# Patient Record
Sex: MALE | Race: BLACK OR AFRICAN AMERICAN
[De-identification: names, ages, dates, MRNs, and addresses within clinical notes are randomized per-mention and may not be internally consistent; named-entity substitution may affect disease eponyms.]

---

## 2017-07-25 ENCOUNTER — HOSPITAL ENCOUNTER (EMERGENCY)
Dept: HOSPITAL 61 - ER | Age: 22
Discharge: HOME | End: 2017-07-25
Payer: SELF-PAY

## 2017-07-25 VITALS — DIASTOLIC BLOOD PRESSURE: 66 MMHG | SYSTOLIC BLOOD PRESSURE: 122 MMHG

## 2017-07-25 VITALS — WEIGHT: 130 LBS | BODY MASS INDEX: 19.7 KG/M2 | HEIGHT: 68 IN

## 2017-07-25 DIAGNOSIS — R20.0: ICD-10-CM

## 2017-07-25 DIAGNOSIS — R07.89: Primary | ICD-10-CM

## 2017-07-25 LAB
CO2 BLD-SCNC: 26 MMOL/L (ref 23–32)
GLUCOSE BLD-MCNC: 77 MG/DL (ref 70–99)
HCT VFR BLD AUTO: 48 % (ref 37–52)
POTASSIUM BLD-SCNC: 3.6 MMOL/L (ref 3.5–5)
SODIUM SERPL-SCNC: 141 MMOL/L (ref 135–145)

## 2017-07-25 PROCEDURE — 93005 ELECTROCARDIOGRAM TRACING: CPT

## 2017-07-25 PROCEDURE — 80047 BASIC METABLC PNL IONIZED CA: CPT

## 2017-07-25 NOTE — PHYS DOC
Past Medical History


Past Medical History:  No Pertinent History


Past Surgical History:  No Surgical History


Alcohol Use:  Heavy


Additional Information:  


PT REPORTS HE IS A DAILY DRINKER, APPROXIMATELY "50 OZ." OF BEER PER DAY. PT 


DENIES HAVING ANY ALCOHOL TODAY.


Drug Use:  None





Adult General


Chief Complaint


Chief Complaint:  CHEST WALL PAIN





HPI


HPI





Patient is a 21  year old male presents with complaints of chest heaviness, 

denies pain. However, the main complaint to the doctor was that he felt 

tingling in his fingers and his face. Patient doesn't describe any weakness or 

pain anywhere, no sick contacts, no exposures.





Review of Systems


Review of Systems





Constitutional: Denies fever or chills 


Eyes: Denies change in visual acuity, redness, or eye pain 


HENT: no headache or pain


Respiratory: Denies cough or shortness of breath 


Cardiovascular: No additional information not addressed in HPI 


GI: Denies abdominal pain





Musculoskeletal: Denies back pain or joint pain or  cramps


Integument: Denies rash or skin lesions []


Neurologic: Denies headache, focal weakness or sensory changes []


Endocrine: Denies polyuria or polydipsia []





Allergies


Allergies





Allergies








Coded Allergies Type Severity Reaction Last Updated Verified


 


  ibuprofen Allergy Severe "THROAT SWELLING" 7/25/17 No











Physical Exam


Physical Exam





Constitutional: Well developed, well nourished, no acute distress, non-toxic 

appearance. []


HENT: Normocephalic, atraumatic, oropharynx moist, no oral exudates, nose 

normal. []


Eyes: PERRLA, EOMI, conjunctiva normal, no discharge. [] 


Neck: Normal range of motion, no tenderness, supple, no stridor. [] 


Cardiovascular:Heart rate regular rhythm, no murmur []


Lungs & Thorax:  Bilateral breath sounds clear to auscultation, no tachypnea


Abdomen: Bowel sounds normal, soft, no tenderness, no masses, no pulsatile 

masses. [] 


Skin: Warm, dry, no erythema, no rash, has tattoos that do not look infected


Back: Normal rom


Extremities: No tenderness, no cyanosis, no clubbing, ROM intact, no edema. [] 


Neurologic: Alert and oriented X 3, normal motor function, normal sensory 

function, no focal deficits noted, no pronator drift, FTN wnl.  Pt ambulated in 

the ED without assistance and with normal gait


Psychologic: Affect normal, judgement normal, mood normal. []





Current Patient Data


Vital Signs





 Vital Signs








  Date Time  Temp Pulse Resp B/P (MAP) Pulse Ox O2 Delivery O2 Flow Rate FiO2


 


7/25/17 11:50 98.2 58 18 139/74 (95) 100 Room Air  





 98.2       








Lab Values





 Laboratory Tests








Test


  7/25/17


12:23


 


POC Hemoglobin


  16.3 g/dL


(14-18)


 


POC Hematocrit 48 % (37-52)  


 


POC Sodium


  141 mmol/L


(135-145)


 


POC Potassium


  3.6 mmol/L


(3.5-5.0)


 


POC Chloride


  103 mmol/L


()


 


POC Total CO2


  26 mmol/L


(23-32)


 


Anion Gap


  17 mmol/L


(6-14)  H


 


POC Blood Urea Nitrogen


  12 mg/dL


(8-26)


 


POC Creatinine


  1.0 mg/dL


(0.5-1.4)


 


Glucose Level


  77 mg/dL


(70-99)


 


POC Ionized Calcium (Kasandra)


  1.20 mmol/L


(1.13-1.32)





 Laboratory Tests


7/25/17 12:23














EKG


EKG


[]





Radiology/Procedures


Radiology/Procedures


[]





Course & Med Decision Making


Course & Med Decision Making


Pertinent Labs and Imaging studies reviewed. (See chart for details)





[dx: paresthesia, non specific chest pain]





Dragon Disclaimer


Dragon Disclaimer


This electronic medical record was generated, in whole or in part, using a 

voice recognition dictation system.





Departure


Departure


Referrals:  


NO PCP (PCP)











KINGA MURILLO MD Jul 25, 2017 12:48

## 2017-07-26 NOTE — EKG
[facility]

              [address] [place] [address]

Test Date:    2017               Test Time:    11:45:31

Pat Name:     ANDREW GOMEZ         Department:   

Patient ID:   [id number]           Room:          

Gender:       M                        Technician:   

:          [id number]               Requested By: KINGA MURILLO

Order Number: [id number]            Reading MD:   Ralph Quiroz

                                 Measurements

Intervals                              Axis          

Rate:         58                       P:            74

AL:           182                      QRS:          89

QRSD:         94                       T:            48

QT:           376                                    

QTc:          372                                    

                           Interpretive Statements

SINUS RHYTHM



Electronically Signed On 2017 8:49:43 CDT by Ralph Quiroz

## 2021-10-05 ENCOUNTER — HOSPITAL ENCOUNTER (EMERGENCY)
Dept: HOSPITAL 61 - ER | Age: 26
Discharge: HOME | End: 2021-10-05
Payer: COMMERCIAL

## 2021-10-05 VITALS
DIASTOLIC BLOOD PRESSURE: 57 MMHG | DIASTOLIC BLOOD PRESSURE: 57 MMHG | SYSTOLIC BLOOD PRESSURE: 117 MMHG | SYSTOLIC BLOOD PRESSURE: 117 MMHG

## 2021-10-05 VITALS — WEIGHT: 140.21 LBS | HEIGHT: 69 IN | BODY MASS INDEX: 20.77 KG/M2
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
## 2021-10-05 DIAGNOSIS — R19.7: ICD-10-CM

## 2021-10-05 DIAGNOSIS — R10.13: Primary | ICD-10-CM

## 2021-10-05 DIAGNOSIS — Z88.8: ICD-10-CM

## 2021-10-05 DIAGNOSIS — R11.2: ICD-10-CM

## 2021-10-05 DIAGNOSIS — F10.20: ICD-10-CM

## 2021-10-05 DIAGNOSIS — F17.200: ICD-10-CM

## 2021-10-05 LAB
ALBUMIN SERPL-MCNC: 4 G/DL (ref 3.4–5)
ALBUMIN/GLOB SERPL: 1.1 {RATIO} (ref 1–1.7)
ALP SERPL-CCNC: 89 U/L (ref 46–116)
ALT SERPL-CCNC: 27 U/L (ref 16–63)
ANION GAP SERPL CALC-SCNC: 13 MMOL/L (ref 6–14)
APTT PPP: YELLOW S
AST SERPL-CCNC: 38 U/L (ref 15–37)
BACTERIA #/AREA URNS HPF: 0 /HPF
BASOPHILS # BLD AUTO: 0.1 X10^3/UL (ref 0–0.2)
BASOPHILS NFR BLD: 1 % (ref 0–3)
BILIRUB SERPL-MCNC: 0.8 MG/DL (ref 0.2–1)
BILIRUB UR QL STRIP: (no result)
BUN SERPL-MCNC: 10 MG/DL (ref 8–26)
BUN/CREAT SERPL: 11 (ref 6–20)
CALCIUM SERPL-MCNC: 8.9 MG/DL (ref 8.5–10.1)
CHLORIDE SERPL-SCNC: 103 MMOL/L (ref 98–107)
CO2 SERPL-SCNC: 24 MMOL/L (ref 21–32)
CREAT SERPL-MCNC: 0.9 MG/DL (ref 0.7–1.3)
EOSINOPHIL NFR BLD: 0.1 X10^3/UL (ref 0–0.7)
EOSINOPHIL NFR BLD: 1 % (ref 0–3)
ERYTHROCYTE [DISTWIDTH] IN BLOOD BY AUTOMATED COUNT: 13.1 % (ref 11.5–14.5)
FIBRINOGEN PPP-MCNC: CLEAR MG/DL
GFR SERPLBLD BASED ON 1.73 SQ M-ARVRAT: 124.4 ML/MIN
GLUCOSE SERPL-MCNC: 78 MG/DL (ref 70–99)
HCT VFR BLD CALC: 48.4 % (ref 39–53)
HGB BLD-MCNC: 17.1 G/DL (ref 13–17.5)
LIPASE: 115 U/L (ref 73–393)
LYMPHOCYTES # BLD: 1.3 X10^3/UL (ref 1–4.8)
LYMPHOCYTES NFR BLD AUTO: 23 % (ref 24–48)
MCH RBC QN AUTO: 31 PG (ref 25–35)
MCHC RBC AUTO-ENTMCNC: 35 G/DL (ref 31–37)
MCV RBC AUTO: 87 FL (ref 79–100)
MONO #: 0.4 X10^3/UL (ref 0–1.1)
MONOCYTES NFR BLD: 8 % (ref 0–9)
NEUT #: 3.8 X10^3/UL (ref 1.8–7.7)
NEUTROPHILS NFR BLD AUTO: 68 % (ref 31–73)
NITRITE UR QL STRIP: NEGATIVE
PH UR STRIP: 6.5 [PH]
PLATELET # BLD AUTO: 203 X10^3/UL (ref 140–400)
POTASSIUM SERPL-SCNC: 3.6 MMOL/L (ref 3.5–5.1)
PROT SERPL-MCNC: 7.6 G/DL (ref 6.4–8.2)
PROT UR STRIP-MCNC: NEGATIVE MG/DL
RBC # BLD AUTO: 5.54 X10^6/UL (ref 4.3–5.7)
RBC #/AREA URNS HPF: 0 /HPF (ref 0–2)
SODIUM SERPL-SCNC: 140 MMOL/L (ref 136–145)
UROBILINOGEN UR-MCNC: 0.2 MG/DL
WBC # BLD AUTO: 5.7 X10^3/UL (ref 4–11)
WBC #/AREA URNS HPF: (no result) /HPF (ref 0–4)

## 2021-10-05 PROCEDURE — 96374 THER/PROPH/DIAG INJ IV PUSH: CPT

## 2021-10-05 PROCEDURE — 96375 TX/PRO/DX INJ NEW DRUG ADDON: CPT

## 2021-10-05 PROCEDURE — 36415 COLL VENOUS BLD VENIPUNCTURE: CPT

## 2021-10-05 PROCEDURE — C9113 INJ PANTOPRAZOLE SODIUM, VIA: HCPCS

## 2021-10-05 PROCEDURE — 80053 COMPREHEN METABOLIC PANEL: CPT

## 2021-10-05 PROCEDURE — 96361 HYDRATE IV INFUSION ADD-ON: CPT

## 2021-10-05 PROCEDURE — 85025 COMPLETE CBC W/AUTO DIFF WBC: CPT

## 2021-10-05 PROCEDURE — 99285 EMERGENCY DEPT VISIT HI MDM: CPT

## 2021-10-05 PROCEDURE — 87086 URINE CULTURE/COLONY COUNT: CPT

## 2021-10-05 PROCEDURE — 83690 ASSAY OF LIPASE: CPT

## 2021-10-05 PROCEDURE — 81001 URINALYSIS AUTO W/SCOPE: CPT

## 2021-10-05 NOTE — PHYS DOC
Past Medical History


Past Medical History:  No Pertinent History


Past Surgical History:  No Surgical History


Smoking Status:  Current Every Day Smoker


Alcohol Use:  Heavy


Drug Use:  None





General Adult


EDM:


Chief Complaint:  ABDOMINAL PAIN





HPI:


HPI:





Patient is a 25  year old who presents with mid abdominal discomfort and 

cramping as well as nausea, vomiting, diarrhea.  Symptoms began last night.  He 

reports at least 4 episodes of vomiting and diarrhea today.  He denies 

hematochezia or melena, or hematemesis.  He denies fever chills.  He denies 

cough, dyspnea, chest pain.  He denies urinary symptoms.  He denies recent 

travel, known sick contacts, or recent antibiotic use.  He denies previous s

imilar symptoms.  He denies any previous abdominal surgery.  He has no abdominal

pain at present.  He reports that he has not been able to eat or drink much at 

all today.  He does admit to daily use of marijuana, as well as daily, heavy use

of alcohol.  He last drank alcohol last night.  He denies any previous history 

of alcohol withdrawal symptoms.  No previous known history of GI illnesses, per 

his report.





Review of Systems:


Review of Systems:


Constitutional:   Denies fever or chills. []


Eyes:   Denies change in visual acuity. []


HENT:   Denies nasal congestion or sore throat. [] 


Respiratory:   Denies cough or shortness of breath. [] 


Cardiovascular:   Denies chest pain or edema. [] 


GI:   Reports epigastric abdominal discomfort, nausea vomiting and diarrhea.  

Denies constipation.  Denies hematemesis, hematochezia or melena.


:  Denies urinary symptoms.


Musculoskeletal:   Denies back pain or joint pain. [] 


Integument:   Denies rash. [] 


Neurologic:   Denies headache, focal weakness or sensory changes. [] 


Lymphatic:  Denies swollen glands. [] 


Psychiatric:  Denies depression or anxiety. []





Heart Score:


C/O Chest Pain:  No


Risk Factors:


Risk Factors:  DM, Current or recent (<one month) smoker, HTN, HLP, family 

history of CAD, obesity.


Risk Scores:


Score 0 - 3:  2.5% MACE over next 6 weeks - Discharge Home


Score 4 - 6:  20.3% MACE over next 6 weeks - Admit for Clinical Observation


Score 7 - 10:  72.7% MACE over next 6 weeks - Early Invasive Strategies





Allergies:


Allergies:





Allergies








Coded Allergies Type Severity Reaction Last Updated Verified


 


  ibuprofen Allergy Severe "THROAT SWELLING" 7/25/17 No











Physical Exam:


PE:





Constitutional: Well developed, well nourished, no acute distress, non-toxic 

appearance. []


HENT: Normocephalic, atraumatic, bilateral external ears normal, oropharynx 

moist, no oral exudates, nose normal. []


Eyes: PERRLA, EOMI, conjunctiva nonicteric, no discharge. [] 


Neck: Normal range of motion, no tenderness, supple, no stridor. [] 


Cardiovascular:Heart rate regular rhythm, no murmur []


Lungs & Thorax:  Bilateral breath sounds clear to auscultation []


Abdomen: Bowel sounds normal, soft, no tenderness, no masses, no pulsatile 

masses.  No flank or abdominal ecchymoses.


Skin: Warm, dry, no erythema, no rash. [] 


Back: No tenderness, no CVA tenderness. [] 


Extremities: No tenderness, no cyanosis, no clubbing, ROM intact, no edema. [] 


Neurologic: Alert and oriented X 3, normal motor function, normal sensory 

function, no focal deficits noted. []


Psychologic: Affect normal, judgement normal, mood normal. []





Current Patient Data:


Vital Signs:





                                   Vital Signs








  Date Time  Temp Pulse Resp B/P (MAP) Pulse Ox O2 Delivery O2 Flow Rate FiO2


 


10/5/21 13:10 98.1 64 16 135/78 (97) 98 Room Air  





 98.1       











EKG:


EKG:


[]





Radiology/Procedures:


Radiology/Procedures:


[]





Course & Med Decision Making:


Course & Med Decision Making


Pertinent Labs and Imaging studies reviewed. (See chart for details)





IV fluid, normal saline bolus given.  IV Zofran and IV Protonix were given.  The

 patient continues to deny abdominal pain.  He has a benign, nonsurgical 

abdominal exam, with no objective evidence of tenderness.  I have discussed the 

findings, differential diagnosis and plan of care with the patient.  No current 

indication for emergent imaging or further invasive exams.  I have recommended 

strongly that he cease use of alcohol, especially excessive alcohol.  I 

recommend he establish care with a primary care physician.  Dietary modification

 instructions were provided for home upon discharge.  I have discussed very 

strict return precautions.  He voiced understanding of instructions provided.





Dragon Disclaimer:


Dragon Disclaimer:


This electronic medical record was generated, in whole or in part, using a voice

 recognition dictation system.





Departure


Departure


Impression:  


   Primary Impression:  


   Nausea and vomiting


   Additional Impression:  


   Epigastric discomfort


Referrals:  


NO PCP (PCP)


Patient Instructions:  Alcoholic Gastritis-Brief, Gastritis, Adult





Additional Instructions:  


Use the medication as directed.  Eat a bland diet.  Stay hydrated and drink 

plenty of fluids.  Please avoid excess use of alcohol.  Tobacco use may also 

worsen your condition.  Please return immediately to the emergency department if

 you develop any severe abdominal pain, vomiting blood, bloody stools, fever of 

100.4 or higher, evidence of dehydration, or any other concerns.  Please 

establish care with a primary care physician for follow-up and routine care of 

your condition.


Scripts


Ondansetron Hcl (ZOFRAN) 4 Mg Tablet


1 TAB PO Q8HRS, #20 TAB


   Prov: CELINA LOPEZ DO         10/5/21 


Omeprazole (OMEPRAZOLE) 20 Mg Capsule.


1 CAP PO DAILY, #30 CAP 5 Refills


   Prov: CELINA LOPEZ DO         10/5/21











CELINA LOPEZ DO              Oct 5, 2021 13:24

## 2022-01-04 ENCOUNTER — HOSPITAL ENCOUNTER (EMERGENCY)
Dept: HOSPITAL 61 - ER | Age: 27
Discharge: LEFT BEFORE DISCHARGE | End: 2022-01-04
Payer: COMMERCIAL

## 2022-01-04 DIAGNOSIS — R51.9: Primary | ICD-10-CM

## 2022-01-04 DIAGNOSIS — R07.89: ICD-10-CM

## 2022-01-04 DIAGNOSIS — Z53.21: ICD-10-CM
